# Patient Record
Sex: FEMALE | Race: OTHER | NOT HISPANIC OR LATINO
[De-identification: names, ages, dates, MRNs, and addresses within clinical notes are randomized per-mention and may not be internally consistent; named-entity substitution may affect disease eponyms.]

---

## 2022-01-02 ENCOUNTER — TRANSCRIPTION ENCOUNTER (OUTPATIENT)
Age: 22
End: 2022-01-02

## 2022-06-07 PROBLEM — Z00.00 ENCOUNTER FOR PREVENTIVE HEALTH EXAMINATION: Status: ACTIVE | Noted: 2022-06-07

## 2022-11-09 ENCOUNTER — NON-APPOINTMENT (OUTPATIENT)
Age: 22
End: 2022-11-09

## 2022-11-09 ENCOUNTER — APPOINTMENT (OUTPATIENT)
Dept: PULMONOLOGY | Facility: CLINIC | Age: 22
End: 2022-11-09

## 2022-11-09 VITALS
OXYGEN SATURATION: 98 % | BODY MASS INDEX: 34.39 KG/M2 | SYSTOLIC BLOOD PRESSURE: 122 MMHG | HEIGHT: 66 IN | DIASTOLIC BLOOD PRESSURE: 78 MMHG | HEART RATE: 88 BPM | WEIGHT: 214 LBS | RESPIRATION RATE: 14 BRPM

## 2022-11-09 PROCEDURE — 99204 OFFICE O/P NEW MOD 45 MIN: CPT

## 2022-11-09 NOTE — ASSESSMENT
[FreeTextEntry1] : Patient likely with obstructive sleep apnea.  I discussed the pathophysiology of sleep apnea with her and its association with her snoring and excessive sleepiness.  Polysomnogram has been ordered and I will see her back after that.  We will most likely treat with oral appliance therapy if sleep apnea is present, in view of her age.  I explained to her that weight loss will be important in controlling her sleep apnea.

## 2022-11-09 NOTE — HISTORY OF PRESENT ILLNESS
[TextBox_4] : 21-year-old female who comes for evaluation of obstructive sleep apnea.  She has been noted to have very loud snoring along with waking up choking in the middle of the night and excessive daytime sleepiness.  She is overweight and this has been going on for a few years.  Generally she gets into bed at 10 PM and falls asleep within half hour.  She will wake up around 3 AM for short period of time.  She will get up in the morning at 7 AM feeling unrefreshed, often with headaches.  During the day she drinks a large coffee in the morning.  She not have her tonsils out or braces on her teeth.  She does not recall any family members who have sleep disorders. [ESS] : 11

## 2022-11-09 NOTE — PHYSICAL EXAM
[No Acute Distress] : no acute distress [Normal Oropharynx] : normal oropharynx [Low Lying Soft Palate] : low lying soft palate [Enlarged Base of the Tongue] : enlarged base of the tongue [III] : Mallampati Class: III [Normal Appearance] : normal appearance [No Neck Mass] : no neck mass [Normal Rate/Rhythm] : normal rate/rhythm [Normal S1, S2] : normal s1, s2 [No Murmurs] : no murmurs [No Resp Distress] : no resp distress [Clear to Auscultation Bilaterally] : clear to auscultation bilaterally [No Abnormalities] : no abnormalities [Benign] : benign [Normal Gait] : normal gait [No Clubbing] : no clubbing [No Cyanosis] : no cyanosis [No Edema] : no edema [FROM] : FROM [Normal Color/ Pigmentation] : normal color/ pigmentation [No Focal Deficits] : no focal deficits [Oriented x3] : oriented x3 [Normal Affect] : normal affect [TextBox_2] : Obese

## 2022-11-09 NOTE — CONSULT LETTER
[Dear  ___] : Dear  [unfilled], [Consult Letter:] : I had the pleasure of evaluating your patient, [unfilled]. [Please see my note below.] : Please see my note below. [Consult Closing:] : Thank you very much for allowing me to participate in the care of this patient.  If you have any questions, please do not hesitate to contact me. [Sincerely,] : Sincerely, [FreeTextEntry3] : Johanna Casper MD FCCP\par D-ABSM\par ABIM board certified in  Pulmonary diseases, Sleep medicine\par Internal medicine\par \par [facility] [place]\par

## 2022-12-01 ENCOUNTER — OUTPATIENT (OUTPATIENT)
Dept: OUTPATIENT SERVICES | Facility: HOSPITAL | Age: 22
LOS: 1 days | End: 2022-12-01
Payer: COMMERCIAL

## 2022-12-01 DIAGNOSIS — G47.33 OBSTRUCTIVE SLEEP APNEA (ADULT) (PEDIATRIC): ICD-10-CM

## 2022-12-01 PROCEDURE — 95810 POLYSOM 6/> YRS 4/> PARAM: CPT | Mod: 26

## 2022-12-01 PROCEDURE — 95810 POLYSOM 6/> YRS 4/> PARAM: CPT

## 2022-12-05 ENCOUNTER — TRANSCRIPTION ENCOUNTER (OUTPATIENT)
Age: 22
End: 2022-12-05

## 2022-12-15 ENCOUNTER — APPOINTMENT (OUTPATIENT)
Dept: PULMONOLOGY | Facility: CLINIC | Age: 22
End: 2022-12-15

## 2022-12-15 VITALS
OXYGEN SATURATION: 98 % | HEART RATE: 78 BPM | WEIGHT: 214 LBS | SYSTOLIC BLOOD PRESSURE: 107 MMHG | DIASTOLIC BLOOD PRESSURE: 74 MMHG | HEIGHT: 66 IN | RESPIRATION RATE: 14 BRPM | BODY MASS INDEX: 34.39 KG/M2

## 2022-12-15 PROCEDURE — 99214 OFFICE O/P EST MOD 30 MIN: CPT

## 2022-12-15 NOTE — ASSESSMENT
[FreeTextEntry1] : The patient has moderate obstructive sleep apnea.  I explained its association with her symptoms of sleepiness and headaches.  She is an excellent oral appliance candidate.  A dental referral has been made and I will see her back here after the appliance is fitted for a repeat sleep study with the appliance in place.

## 2022-12-15 NOTE — HISTORY OF PRESENT ILLNESS
[Obstructive Sleep Apnea] : obstructive sleep apnea [Lab] : lab [TextBox_100] : 12/22 [TextBox_108] : 17 [TextBox_112] : 99 [TextBox_116] : 87 [TextBox_165] : I reviewed the patient's sleep study with the patient.\par

## 2022-12-19 ENCOUNTER — TRANSCRIPTION ENCOUNTER (OUTPATIENT)
Age: 22
End: 2022-12-19

## 2023-03-14 ENCOUNTER — NON-APPOINTMENT (OUTPATIENT)
Age: 23
End: 2023-03-14

## 2023-03-21 ENCOUNTER — OFFICE (OUTPATIENT)
Dept: URBAN - METROPOLITAN AREA CLINIC 116 | Facility: CLINIC | Age: 23
Setting detail: OPHTHALMOLOGY
End: 2023-03-21
Payer: COMMERCIAL

## 2023-03-21 DIAGNOSIS — H10.433: ICD-10-CM

## 2023-03-21 PROCEDURE — 92014 COMPRE OPH EXAM EST PT 1/>: CPT | Performed by: OPTOMETRIST

## 2023-03-21 ASSESSMENT — REFRACTION_CURRENTRX
OD_CYLINDER: -1.00
OD_CYLINDER: -1.00
OS_CYLINDER: -1.25
OS_CYLINDER: -1.00
OS_AXIS: 169
OD_OVR_VA: 20/
OS_CYLINDER: -1.75
OD_SPHERE: -3.00
OS_OVR_VA: 20/
OS_AXIS: 157
OS_CYLINDER: -1.00
OD_CYLINDER: -1.00
OD_AXIS: 178
OD_SPHERE: -3.50
OS_AXIS: 170
OS_VPRISM_DIRECTION: SV
OS_VPRISM_DIRECTION: SV
OD_SPHERE: -4.25
OD_CYLINDER: -1.50
OS_SPHERE: -3.50
OS_OVR_VA: 20/
OS_OVR_VA: 20/
OD_OVR_VA: 20/
OS_SPHERE: -3.25
OD_OVR_VA: 20/
OS_SPHERE: -3.50
OD_AXIS: 004
OD_AXIS: 020
OD_VPRISM_DIRECTION: SV
OS_AXIS: 03
OD_SPHERE: -4.00
OS_VPRISM_DIRECTION: SV
OD_AXIS: 04
OD_VPRISM_DIRECTION: SV
OD_VPRISM_DIRECTION: SV
OS_SPHERE: -3.25

## 2023-03-21 ASSESSMENT — REFRACTION_MANIFEST
OS_SPHERE: -3.50
OS_VA1: 20/20
OD_CYLINDER: -1.00
OD_AXIS: 010
OS_AXIS: 165
OD_CYLINDER: -1.00
OD_VA1: 20/20
OS_CYLINDER: -1.00
OS_VA1: 20/20
OS_SPHERE: -3.50
OS_VA1: 20/25
OS_CYLINDER: -1.50
OS_AXIS: 170
OD_VA1: 20/25
OD_VA1: 20/20
OD_AXIS: 005
OD_AXIS: 005
OD_SPHERE: -4.50
OD_SPHERE: -4.00
OS_SPHERE: -3.25
OD_CYLINDER: -1.25
OS_AXIS: 165
OS_SPHERE: -3.00
OD_VA1: 2020-
OS_AXIS: 170
OS_CYLINDER: -1.25
OS_VA1: 20/20
OS_CYLINDER: -1.75
OD_AXIS: 010
OD_CYLINDER: -1.50
OD_SPHERE: -4.50
OD_SPHERE: -4.00

## 2023-03-21 ASSESSMENT — TONOMETRY
OS_IOP_MMHG: 15
OD_IOP_MMHG: 17

## 2023-03-21 ASSESSMENT — AXIALLENGTH_DERIVED
OS_AL: 24.4859
OD_AL: 25.2399
OD_AL: 24.9098
OS_AL: 24.6443
OS_AL: 24.8048
OS_AL: 24.8048
OD_AL: 24.9642
OD_AL: 25.2957
OD_AL: 25.1289
OS_AL: 24.6443

## 2023-03-21 ASSESSMENT — KERATOMETRY
OD_K1POWER_DIOPTERS: 43.75
OD_AXISANGLE_DEGREES: 100
OS_K1POWER_DIOPTERS: 44.25
METHOD_AUTO_MANUAL: AUTO
OS_K2POWER_DIOPTERS: 45.75
OS_AXISANGLE_DEGREES: 080
OD_K2POWER_DIOPTERS: 46.00

## 2023-03-21 ASSESSMENT — REFRACTION_AUTOREFRACTION
OS_CYLINDER: -1.75
OS_SPHERE: -3.50
OD_CYLINDER: -1.75
OD_SPHERE: -4.50
OD_AXIS: 010
OS_AXIS: 170

## 2023-03-21 ASSESSMENT — VISUAL ACUITY
OS_BCVA: 20/30-
OD_BCVA: 20/30

## 2023-03-21 ASSESSMENT — SPHEQUIV_DERIVED
OD_SPHEQUIV: -4.5
OS_SPHEQUIV: -4.375
OD_SPHEQUIV: -5.375
OS_SPHEQUIV: -4
OD_SPHEQUIV: -4.625
OD_SPHEQUIV: -5.25
OS_SPHEQUIV: -4
OS_SPHEQUIV: -4.375
OD_SPHEQUIV: -5
OS_SPHEQUIV: -3.625

## 2023-03-21 ASSESSMENT — CONFRONTATIONAL VISUAL FIELD TEST (CVF)
OS_FINDINGS: FULL
OD_FINDINGS: FULL

## 2023-08-03 ENCOUNTER — NON-APPOINTMENT (OUTPATIENT)
Age: 23
End: 2023-08-03

## 2023-09-28 ENCOUNTER — APPOINTMENT (OUTPATIENT)
Dept: GASTROENTEROLOGY | Facility: CLINIC | Age: 23
End: 2023-09-28

## 2023-10-02 ENCOUNTER — APPOINTMENT (OUTPATIENT)
Dept: PULMONOLOGY | Facility: CLINIC | Age: 23
End: 2023-10-02

## 2023-10-04 ENCOUNTER — APPOINTMENT (OUTPATIENT)
Dept: OBGYN | Facility: CLINIC | Age: 23
End: 2023-10-04

## 2023-10-10 ENCOUNTER — APPOINTMENT (OUTPATIENT)
Dept: OBGYN | Facility: CLINIC | Age: 23
End: 2023-10-10
Payer: MEDICAID

## 2023-10-10 VITALS
SYSTOLIC BLOOD PRESSURE: 130 MMHG | DIASTOLIC BLOOD PRESSURE: 82 MMHG | HEIGHT: 66 IN | BODY MASS INDEX: 34.76 KG/M2 | WEIGHT: 216.3 LBS

## 2023-10-10 DIAGNOSIS — Z86.69 PERSONAL HISTORY OF OTHER DISEASES OF THE NERVOUS SYSTEM AND SENSE ORGANS: ICD-10-CM

## 2023-10-10 DIAGNOSIS — Z78.9 OTHER SPECIFIED HEALTH STATUS: ICD-10-CM

## 2023-10-10 DIAGNOSIS — F17.290 NICOTINE DEPENDENCE, OTHER TOBACCO PRODUCT, UNCOMPLICATED: ICD-10-CM

## 2023-10-10 DIAGNOSIS — Z82.0 FAMILY HISTORY OF EPILEPSY AND OTHER DISEASES OF THE NERVOUS SYSTEM: ICD-10-CM

## 2023-10-10 DIAGNOSIS — Z82.49 FAMILY HISTORY OF ISCHEMIC HEART DISEASE AND OTHER DISEASES OF THE CIRCULATORY SYSTEM: ICD-10-CM

## 2023-10-10 DIAGNOSIS — Z80.42 FAMILY HISTORY OF MALIGNANT NEOPLASM OF PROSTATE: ICD-10-CM

## 2023-10-10 DIAGNOSIS — Z01.419 ENCOUNTER FOR GYNECOLOGICAL EXAMINATION (GENERAL) (ROUTINE) W/OUT ABNORMAL FINDINGS: ICD-10-CM

## 2023-10-10 DIAGNOSIS — Z12.4 ENCOUNTER FOR SCREENING FOR MALIGNANT NEOPLASM OF CERVIX: ICD-10-CM

## 2023-10-10 DIAGNOSIS — Z86.39 PERSONAL HISTORY OF OTHER ENDOCRINE, NUTRITIONAL AND METABOLIC DISEASE: ICD-10-CM

## 2023-10-10 DIAGNOSIS — Z83.3 FAMILY HISTORY OF DIABETES MELLITUS: ICD-10-CM

## 2023-10-10 LAB
BILIRUB UR QL STRIP: NORMAL
CLARITY UR: CLEAR
COLLECTION METHOD: NORMAL
GLUCOSE UR-MCNC: NORMAL
HCG UR QL: 0.2 EU/DL
HGB UR QL STRIP.AUTO: NORMAL
KETONES UR-MCNC: NORMAL
LEUKOCYTE ESTERASE UR QL STRIP: NORMAL
NITRITE UR QL STRIP: NORMAL
PH UR STRIP: 7
PROT UR STRIP-MCNC: NORMAL
SP GR UR STRIP: 1.01

## 2023-10-10 PROCEDURE — 99385 PREV VISIT NEW AGE 18-39: CPT

## 2023-10-10 PROCEDURE — 81003 URINALYSIS AUTO W/O SCOPE: CPT | Mod: QW

## 2023-10-10 RX ORDER — OMEPRAZOLE 40 MG/1
40 CAPSULE, DELAYED RELEASE ORAL
Refills: 0 | Status: ACTIVE | COMMUNITY

## 2023-10-10 RX ORDER — NORETHINDRONE ACETATE AND ETHINYL ESTRADIOL AND FERROUS FUMARATE 1.5-30(21)
1.5-3 KIT ORAL
Refills: 0 | Status: ACTIVE | COMMUNITY

## 2023-10-19 ENCOUNTER — APPOINTMENT (OUTPATIENT)
Dept: OBGYN | Facility: CLINIC | Age: 23
End: 2023-10-19
Payer: MEDICAID

## 2023-10-19 VITALS
BODY MASS INDEX: 34.72 KG/M2 | DIASTOLIC BLOOD PRESSURE: 62 MMHG | SYSTOLIC BLOOD PRESSURE: 100 MMHG | WEIGHT: 216 LBS | HEIGHT: 66 IN

## 2023-10-19 DIAGNOSIS — N76.0 ACUTE VAGINITIS: ICD-10-CM

## 2023-10-19 LAB
C TRACH RRNA SPEC QL NAA+PROBE: NOT DETECTED
ESTIMATED AVERAGE GLUCOSE: 117 MG/DL
HBA1C MFR BLD HPLC: 5.7 %
N GONORRHOEA RRNA SPEC QL NAA+PROBE: NOT DETECTED
PROLACTIN SERPL-MCNC: 44.3 NG/ML
SOURCE AMPLIFICATION: NORMAL
TESTOST FREE SERPL-MCNC: 5 PG/ML
TESTOST SERPL-MCNC: 60.4 NG/DL
TSH SERPL-ACNC: 2.4 UIU/ML

## 2023-10-19 PROCEDURE — 99213 OFFICE O/P EST LOW 20 MIN: CPT

## 2023-10-19 RX ORDER — NORETHINDRONE ACETATE AND ETHINYL ESTRADIOL 1.5; .03 MG/1; MG/1
1.5-3 TABLET ORAL
Qty: 1 | Refills: 6 | Status: ACTIVE | COMMUNITY
Start: 2023-10-19 | End: 1900-01-01

## 2023-10-21 LAB
CANDIDA VAG CYTO: DETECTED
G VAGINALIS+PREV SP MTYP VAG QL MICRO: NOT DETECTED
T VAGINALIS VAG QL WET PREP: NOT DETECTED

## 2023-11-07 RX ORDER — NYSTATIN 100000 1/G
100000 POWDER TOPICAL
Qty: 1 | Refills: 0 | Status: ACTIVE | COMMUNITY
Start: 2023-10-19 | End: 1900-01-01

## 2023-11-07 RX ORDER — FLUCONAZOLE 150 MG/1
150 TABLET ORAL
Qty: 2 | Refills: 0 | Status: ACTIVE | COMMUNITY
Start: 2023-10-19 | End: 1900-01-01

## 2023-11-09 ENCOUNTER — APPOINTMENT (OUTPATIENT)
Dept: PULMONOLOGY | Facility: CLINIC | Age: 23
End: 2023-11-09
Payer: MEDICAID

## 2023-11-09 VITALS
HEART RATE: 79 BPM | RESPIRATION RATE: 16 BRPM | SYSTOLIC BLOOD PRESSURE: 104 MMHG | BODY MASS INDEX: 34.72 KG/M2 | HEIGHT: 66 IN | OXYGEN SATURATION: 98 % | DIASTOLIC BLOOD PRESSURE: 64 MMHG | WEIGHT: 216 LBS

## 2023-11-09 PROCEDURE — 99214 OFFICE O/P EST MOD 30 MIN: CPT

## 2023-11-09 RX ORDER — OMEPRAZOLE 40 MG/1
40 CAPSULE, DELAYED RELEASE ORAL
Refills: 0 | Status: ACTIVE | COMMUNITY

## 2024-01-15 ENCOUNTER — APPOINTMENT (OUTPATIENT)
Dept: OBGYN | Facility: CLINIC | Age: 24
End: 2024-01-15
Payer: COMMERCIAL

## 2024-01-15 VITALS
SYSTOLIC BLOOD PRESSURE: 118 MMHG | BODY MASS INDEX: 35.2 KG/M2 | WEIGHT: 219 LBS | HEIGHT: 66 IN | DIASTOLIC BLOOD PRESSURE: 72 MMHG

## 2024-01-15 DIAGNOSIS — Z30.41 ENCOUNTER FOR SURVEILLANCE OF CONTRACEPTIVE PILLS: ICD-10-CM

## 2024-01-15 DIAGNOSIS — E28.2 POLYCYSTIC OVARIAN SYNDROME: ICD-10-CM

## 2024-01-15 DIAGNOSIS — N91.1 SECONDARY AMENORRHEA: ICD-10-CM

## 2024-01-15 DIAGNOSIS — N92.6 IRREGULAR MENSTRUATION, UNSPECIFIED: ICD-10-CM

## 2024-01-15 LAB
HCG UR QL: NEGATIVE
QUALITY CONTROL: YES

## 2024-01-15 PROCEDURE — 81025 URINE PREGNANCY TEST: CPT

## 2024-01-15 PROCEDURE — 99214 OFFICE O/P EST MOD 30 MIN: CPT

## 2024-01-15 RX ORDER — NORETHINDRONE ACETATE 5 MG/1
5 TABLET ORAL DAILY
Qty: 10 | Refills: 1 | Status: ACTIVE | COMMUNITY
Start: 2024-01-15 | End: 1900-01-01

## 2024-01-15 NOTE — HISTORY OF PRESENT ILLNESS
[FreeTextEntry1] : 22 y.o P0 (LMP09/2023) here for OCP refill and follow up for secondary amenorrhea  Patient overall doing well. States she has not had her period since 09/2023. Sexually active, uses condoms. Continues to take OCP without issue. Takes placebo week, instead of continuous use. Reports pelvic cramping pain around the time when she believes she is ovulating.  Otherwise, no additional complaints

## 2024-01-15 NOTE — DISCUSSION/SUMMARY
[FreeTextEntry1] : #Secondary amenorrhea - Urine pregnancy test: negative - discussed etiologies of abnormal uterine bleeding including hormonal vs structural vs cytological - hormonal causes such as thyroid abnormalities, irregularities in menopausal hormones, and prolactin levels were reviewed, elevated prolactin noted on last visit. Denies any visual disturbances or nipple discharge. Repeat prolactin today, endocrinology consult ordered  - Counseled patient regarding inducing menses with progesterone with transition back to OCP once menses returns.  - She verbalized understanding and agreement with above counseling regarding differential diagnosis, evaluation, and plan. She was given time for questions/concerns which were all answered to her apparent satisfaction.   RTO 1-2 months for follow up

## 2024-01-31 ENCOUNTER — NON-APPOINTMENT (OUTPATIENT)
Age: 24
End: 2024-01-31

## 2024-03-13 NOTE — ASSESSMENT
[FreeTextEntry1] : Moderate obstructive sleep apnea with excessive daytime sleepiness.  This requires treatment.  AutoPap at 4-16 cm H2O  was ordered for the patient and will be delivered to the patient's house.  Appropriate use parameters were discussed with the patient.  Patient will return after being on AutoPap for 6 to 8 weeks so that we can review compliance and efficacy and address any problems the patient may have with AutoPAP. Yes

## 2024-03-13 NOTE — HISTORY OF PRESENT ILLNESS
[TextBox_4] : We diagnosed the patient with moderate obstructive sleep apnea last year.  I felt she was a good oral appliance candidate.  She started to go for oral appliance therapy but her insurance will not cover the oral appliance.  Therefore she needs to be treated with CPAP.  Her weight is similar to where it was last year when we tested her.  She reports ongoing excessive daytime sleepiness.

## 2024-04-22 ENCOUNTER — OFFICE (OUTPATIENT)
Dept: URBAN - METROPOLITAN AREA CLINIC 116 | Facility: CLINIC | Age: 24
Setting detail: OPHTHALMOLOGY
End: 2024-04-22
Payer: COMMERCIAL

## 2024-04-22 DIAGNOSIS — H10.433: ICD-10-CM

## 2024-04-22 DIAGNOSIS — H52.13: ICD-10-CM

## 2024-04-22 PROCEDURE — 92015 DETERMINE REFRACTIVE STATE: CPT | Performed by: OPTOMETRIST

## 2024-04-22 PROCEDURE — 92014 COMPRE OPH EXAM EST PT 1/>: CPT | Performed by: OPTOMETRIST

## 2024-05-09 ENCOUNTER — OFFICE (OUTPATIENT)
Dept: URBAN - METROPOLITAN AREA CLINIC 94 | Facility: CLINIC | Age: 24
Setting detail: OPHTHALMOLOGY
End: 2024-05-09
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
## 2024-05-09 PROCEDURE — NO SHOW FE NO SHOW FEE: Performed by: OPHTHALMOLOGY

## 2024-05-30 ENCOUNTER — APPOINTMENT (OUTPATIENT)
Dept: PULMONOLOGY | Facility: CLINIC | Age: 24
End: 2024-05-30
Payer: COMMERCIAL

## 2024-05-30 VITALS
OXYGEN SATURATION: 98 % | RESPIRATION RATE: 16 BRPM | HEART RATE: 91 BPM | SYSTOLIC BLOOD PRESSURE: 116 MMHG | HEIGHT: 66 IN | BODY MASS INDEX: 33.59 KG/M2 | WEIGHT: 209 LBS | DIASTOLIC BLOOD PRESSURE: 70 MMHG

## 2024-05-30 DIAGNOSIS — R06.83 SNORING: ICD-10-CM

## 2024-05-30 DIAGNOSIS — G47.33 OBSTRUCTIVE SLEEP APNEA (ADULT) (PEDIATRIC): ICD-10-CM

## 2024-05-30 DIAGNOSIS — G47.19 OTHER HYPERSOMNIA: ICD-10-CM

## 2024-05-30 PROCEDURE — 99214 OFFICE O/P EST MOD 30 MIN: CPT

## 2024-05-30 RX ORDER — AMOXICILLIN 875 MG/1
TABLET, FILM COATED ORAL
Refills: 0 | Status: ACTIVE | COMMUNITY

## 2024-05-30 NOTE — END OF VISIT
[Time Spent: ___ minutes] : I have spent [unfilled] minutes of time on the encounter. [FreeTextEntry3] : I reviewed all recent notes, orders, lab results and images for 15 minutes on all available platforms (including [employer], [employer], [employer], and [employer] Epic prior to this visit and made notes. Reviewed the pathophysiology, associated medical conditions, AMARO and Rx options for ISHAAN (including INSPIRE, oral appliance and CPAP. and discussed CPAP accommodation and Compliance monitoring. Reviewed the patient's sleep study with the patient

## 2024-05-30 NOTE — CONSULT LETTER
[Dear  ___] : Dear  [unfilled], [Consult Letter:] : I had the pleasure of evaluating your patient, [unfilled]. [Please see my note below.] : Please see my note below. [Consult Closing:] : Thank you very much for allowing me to participate in the care of this patient.  If you have any questions, please do not hesitate to contact me. [Sincerely,] : Sincerely, [DrCk  ___] : Dr. LE

## 2024-05-30 NOTE — HISTORY OF PRESENT ILLNESS
[TextBox_4] : Dr Casper; 11/9/23 We diagnosed the patient with moderate obstructive sleep apnea last year.  I felt she was a good oral appliance candidate.  She started to go for oral appliance therapy but her insurance will not cover the oral appliance.  Therefore she needs to be treated with CPAP.  Her weight is similar to where it was last year when we tested her.  She reports ongoing excessive daytime sleepiness.  5/30/24 My first visit with this patient  I reviewed all recent notes, orders, lab results and images for 15 minutes on all available platforms (including [facility], [employer], [employer], and [employer] Epic prior to this visit and made notes.  Moderate ISHAAN Oral appliance not covered Unable to comply with APAP  BMI=33.73  CPAP failure Now has insurance that would cover an oral sleep appliance

## 2024-05-30 NOTE — DISCUSSION/SUMMARY
[FreeTextEntry1] : IMP  Obesity  ISHAAN Snoring Excessive daytime sleepiness CPAP failure  Plan  DC CPAP Oral appliance referral Weight loss WP HST 2 month FU

## 2024-08-15 ENCOUNTER — APPOINTMENT (OUTPATIENT)
Dept: PULMONOLOGY | Facility: CLINIC | Age: 24
End: 2024-08-15

## 2024-09-09 ENCOUNTER — APPOINTMENT (OUTPATIENT)
Dept: OBGYN | Facility: CLINIC | Age: 24
End: 2024-09-09

## 2024-12-12 ENCOUNTER — OFFICE (OUTPATIENT)
Dept: URBAN - METROPOLITAN AREA CLINIC 112 | Facility: CLINIC | Age: 24
Setting detail: OPHTHALMOLOGY
End: 2024-12-12
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
## 2024-12-12 PROCEDURE — NO SHOW FE NO SHOW FEE: Performed by: OPHTHALMOLOGY

## 2024-12-19 ENCOUNTER — OFFICE (OUTPATIENT)
Dept: URBAN - METROPOLITAN AREA CLINIC 94 | Facility: CLINIC | Age: 24
Setting detail: OPHTHALMOLOGY
End: 2024-12-19
Payer: MEDICAID

## 2024-12-19 DIAGNOSIS — H52.13: ICD-10-CM

## 2024-12-19 PROCEDURE — SCREF LASIK EVAL: Performed by: OPHTHALMOLOGY

## 2024-12-19 ASSESSMENT — CONFRONTATIONAL VISUAL FIELD TEST (CVF)
OS_FINDINGS: FULL
OD_FINDINGS: FULL

## 2024-12-19 ASSESSMENT — TONOMETRY
OS_IOP_MMHG: 13
OD_IOP_MMHG: 12

## 2024-12-20 ASSESSMENT — REFRACTION_CURRENTRX
OS_AXIS: 169
OS_AXIS: 157
OS_CYLINDER: -1.25
OS_CYLINDER: -1.75
OS_AXIS: 170
OD_SPHERE: -3.50
OD_VPRISM_DIRECTION: SV
OS_SPHERE: -3.50
OS_OVR_VA: 20/
OD_AXIS: 178
OD_CYLINDER: -1.50
OS_OVR_VA: 20/
OD_CYLINDER: -1.00
OD_OVR_VA: 20/
OS_CYLINDER: -1.00
OS_CYLINDER: -1.75
OD_SPHERE: -4.00
OD_CYLINDER: -1.50
OS_SPHERE: -3.50
OD_OVR_VA: 20/
OS_CYLINDER: -1.75
OS_VPRISM_DIRECTION: SV
OS_SPHERE: -3.25
OD_CYLINDER: -1.00
OS_SPHERE: -3.50
OD_AXIS: 010
OS_VPRISM_DIRECTION: SV
OS_VPRISM_DIRECTION: SV
OD_SPHERE: -4.50
OS_SPHERE: -3.50
OS_OVR_VA: 20/
OD_AXIS: 004
OD_AXIS: 020
OD_VPRISM_DIRECTION: SV
OD_CYLINDER: -1.00
OD_SPHERE: -3.00
OD_OVR_VA: 20/
OS_CYLINDER: -1.00
OS_AXIS: 03
OD_SPHERE: -4.25
OS_VPRISM_DIRECTION: SV
OD_VPRISM_DIRECTION: SV
OS_AXIS: 165
OD_AXIS: 04
OD_VPRISM_DIRECTION: SV
OD_SPHERE: -4.50
OS_AXIS: 158
OS_SPHERE: -3.25
OD_AXIS: 007
OD_CYLINDER: -1.50

## 2024-12-20 ASSESSMENT — KERATOMETRY
OS_K2POWER_DIOPTERS: 45.25
METHOD_AUTO_MANUAL: AUTO
OD_AXISANGLE_DEGREES: 103
OD_K2POWER_DIOPTERS: 45.25
OS_AXISANGLE_DEGREES: 081
OD_K1POWER_DIOPTERS: 44.25
OS_K1POWER_DIOPTERS: 44.00

## 2024-12-20 ASSESSMENT — REFRACTION_MANIFEST
OS_CYLINDER: -1.75
OS_VA1: 20/25
OS_AXIS: 170
OS_CYLINDER: -1.00
OS_AXIS: 165
OD_VA1: 20/25
OS_VA1: 20/20
OS_CYLINDER: -1.75
OS_AXIS: 165
OD_SPHERE: -4.50
OS_CYLINDER: -1.25
OS_VA1: 20/25
OS_SPHERE: -3.50
OD_CYLINDER: -1.25
OD_CYLINDER: -1.50
OD_AXIS: 005
OD_AXIS: 005
OD_CYLINDER: -1.00
OD_AXIS: 010
OD_VA1: 20/20
OD_AXIS: 010
OD_SPHERE: -4.00
OD_AXIS: 010
OS_VA1: 20/20
OD_VA1: 20/20
OD_CYLINDER: -1.00
OS_AXIS: 170
OS_AXIS: 165
OS_SPHERE: -3.50
OD_SPHERE: -4.50
OD_VA1: 20/20
OS_SPHERE: -3.25
OS_SPHERE: -3.50
OS_CYLINDER: -1.50
OD_SPHERE: -4.50
OS_VA1: 20/20
OD_VA1: 2020-
OS_SPHERE: -3.00
OS_VA1: 20/20
OD_AXIS: 010
OS_AXIS: 165
OD_SPHERE: -4.00
OD_CYLINDER: -1.50
OD_CYLINDER: -1.50
OD_VA1: 20/25
OD_SPHERE: -4.50
OS_CYLINDER: -1.50
OS_SPHERE: -3.50

## 2024-12-20 ASSESSMENT — REFRACTION_AUTOREFRACTION
OS_SPHERE: -3.50
OD_AXIS: 011
OD_CYLINDER: -2.00
OD_SPHERE: -4.00
OS_AXIS: 165
OS_CYLINDER: -1.50

## 2024-12-20 ASSESSMENT — VISUAL ACUITY
OS_BCVA: 20/20-1
OD_BCVA: 20/20-1

## 2025-03-13 ENCOUNTER — OTHER LOCATION (OUTPATIENT)
Dept: URBAN - METROPOLITAN AREA LASIK CENTER 6 | Facility: LASIK CENTER | Age: 25
Setting detail: OPHTHALMOLOGY
End: 2025-03-13

## 2025-03-13 DIAGNOSIS — H52.13: ICD-10-CM

## 2025-03-13 PROCEDURE — 65760 KERATOMILEUSIS: CPT | Mod: GY,RT,LT | Performed by: OPHTHALMOLOGY

## 2025-03-14 ENCOUNTER — RX ONLY (RX ONLY)
Age: 25
End: 2025-03-14

## 2025-03-14 ENCOUNTER — OFFICE (OUTPATIENT)
Dept: URBAN - METROPOLITAN AREA CLINIC 94 | Facility: CLINIC | Age: 25
Setting detail: OPHTHALMOLOGY
End: 2025-03-14
Payer: MEDICAID

## 2025-03-14 DIAGNOSIS — H17.823: ICD-10-CM

## 2025-03-14 PROCEDURE — 99024 POSTOP FOLLOW-UP VISIT: CPT | Performed by: PHYSICIAN ASSISTANT

## 2025-03-14 ASSESSMENT — REFRACTION_CURRENTRX
OD_CYLINDER: -1.50
OD_SPHERE: -3.50
OD_OVR_VA: 20/
OS_VPRISM_DIRECTION: SV
OD_SPHERE: -4.00
OS_SPHERE: -3.50
OS_CYLINDER: -1.25
OD_VPRISM_DIRECTION: SV
OS_AXIS: 158
OS_SPHERE: -3.25
OD_CYLINDER: -1.00
OD_VPRISM_DIRECTION: SV
OD_AXIS: 020
OS_OVR_VA: 20/
OD_CYLINDER: -1.00
OD_CYLINDER: -1.50
OD_OVR_VA: 20/
OD_SPHERE: -4.25
OS_SPHERE: -3.50
OD_AXIS: 004
OS_CYLINDER: -1.75
OS_SPHERE: -3.25
OS_AXIS: 169
OS_VPRISM_DIRECTION: SV
OD_VPRISM_DIRECTION: SV
OD_SPHERE: -4.50
OS_CYLINDER: -1.00
OD_CYLINDER: -1.50
OD_SPHERE: -3.00
OS_CYLINDER: -1.00
OS_OVR_VA: 20/
OS_AXIS: 170
OS_VPRISM_DIRECTION: SV
OS_SPHERE: -3.50
OD_AXIS: 178
OS_AXIS: 165
OD_AXIS: 04
OD_AXIS: 007
OD_SPHERE: -4.50
OD_CYLINDER: -1.00
OS_AXIS: 03
OD_AXIS: 010
OD_OVR_VA: 20/
OS_CYLINDER: -1.75
OS_VPRISM_DIRECTION: SV
OS_OVR_VA: 20/
OS_AXIS: 157
OD_VPRISM_DIRECTION: SV
OS_SPHERE: -3.50
OS_CYLINDER: -1.75

## 2025-03-14 ASSESSMENT — VISUAL ACUITY
OS_BCVA: 20/20-1
OD_BCVA: 20/20

## 2025-03-14 ASSESSMENT — REFRACTION_MANIFEST
OS_AXIS: 165
OD_SPHERE: -4.00
OD_AXIS: 010
OD_VA1: 20/20
OD_CYLINDER: -1.00
OS_AXIS: 170
OD_CYLINDER: -1.00
OS_CYLINDER: -1.75
OS_VA1: 20/25
OD_SPHERE: -4.00
OD_AXIS: 010
OS_VA1: 20/20
OD_AXIS: 010
OD_SPHERE: -4.50
OS_VA1: 20/20
OD_SPHERE: -4.50
OD_VA1: 20/25
OS_SPHERE: -3.50
OD_SPHERE: -4.50
OD_AXIS: 010
OS_CYLINDER: -1.50
OS_VA1: 20/20
OS_SPHERE: -3.50
OD_CYLINDER: -1.50
OD_CYLINDER: -1.25
OS_CYLINDER: -1.25
OS_CYLINDER: -1.50
OS_VA1: 20/25
OD_CYLINDER: -1.50
OD_SPHERE: -4.50
OS_SPHERE: -3.50
OD_CYLINDER: -1.50
OS_AXIS: 165
OD_VA1: 20/25
OS_AXIS: 170
OS_AXIS: 165
OS_CYLINDER: -1.00
OD_AXIS: 005
OD_VA1: 20/20
OS_SPHERE: -3.50
OD_VA1: 2020-
OD_VA1: 20/20
OS_CYLINDER: -1.75
OS_VA1: 20/20
OD_AXIS: 005
OS_AXIS: 165
OS_SPHERE: -3.00
OS_SPHERE: -3.25

## 2025-03-14 ASSESSMENT — REFRACTION_AUTOREFRACTION
OD_CYLINDER: -0.25
OD_AXIS: 045
OS_SPHERE: +1.00
OS_CYLINDER: -0.25
OS_AXIS: 120
OD_SPHERE: +0.75

## 2025-03-14 ASSESSMENT — KERATOMETRY
OS_K2POWER_DIOPTERS: 40.50
OD_AXISANGLE_DEGREES: 164
OS_AXISANGLE_DEGREES: 065
OS_K1POWER_DIOPTERS: 40.25
METHOD_AUTO_MANUAL: AUTO
OD_K1POWER_DIOPTERS: 40.00
OD_K2POWER_DIOPTERS: 40.25

## 2025-03-14 ASSESSMENT — CONFRONTATIONAL VISUAL FIELD TEST (CVF)
OS_FINDINGS: FULL
OD_FINDINGS: FULL

## 2025-03-21 ENCOUNTER — OFFICE (OUTPATIENT)
Dept: URBAN - METROPOLITAN AREA CLINIC 94 | Facility: CLINIC | Age: 25
Setting detail: OPHTHALMOLOGY
End: 2025-03-21
Payer: MEDICAID

## 2025-03-21 DIAGNOSIS — H17.823: ICD-10-CM

## 2025-03-21 PROCEDURE — 99024 POSTOP FOLLOW-UP VISIT: CPT | Performed by: PHYSICIAN ASSISTANT

## 2025-03-21 ASSESSMENT — REFRACTION_CURRENTRX
OS_CYLINDER: -1.25
OS_CYLINDER: -1.75
OS_AXIS: 157
OD_CYLINDER: -1.00
OS_AXIS: 165
OS_SPHERE: -3.50
OS_SPHERE: -3.25
OS_VPRISM_DIRECTION: SV
OD_CYLINDER: -1.00
OD_VPRISM_DIRECTION: SV
OD_AXIS: 178
OS_AXIS: 158
OS_AXIS: 169
OD_SPHERE: -3.00
OS_VPRISM_DIRECTION: SV
OS_AXIS: 03
OD_SPHERE: -4.00
OD_CYLINDER: -1.50
OS_OVR_VA: 20/
OD_SPHERE: -4.50
OD_AXIS: 004
OS_SPHERE: -3.50
OD_VPRISM_DIRECTION: SV
OD_AXIS: 020
OD_AXIS: 04
OD_AXIS: 007
OD_CYLINDER: -1.50
OD_OVR_VA: 20/
OS_AXIS: 170
OS_OVR_VA: 20/
OS_SPHERE: -3.50
OS_SPHERE: -3.50
OS_OVR_VA: 20/
OD_OVR_VA: 20/
OD_SPHERE: -4.25
OD_VPRISM_DIRECTION: SV
OD_OVR_VA: 20/
OD_AXIS: 010
OD_SPHERE: -3.50
OD_SPHERE: -4.50
OS_VPRISM_DIRECTION: SV
OD_VPRISM_DIRECTION: SV
OS_CYLINDER: -1.75
OD_CYLINDER: -1.00
OD_CYLINDER: -1.50
OS_CYLINDER: -1.00
OS_VPRISM_DIRECTION: SV
OS_CYLINDER: -1.75
OS_CYLINDER: -1.00
OS_SPHERE: -3.25

## 2025-03-21 ASSESSMENT — REFRACTION_MANIFEST
OD_AXIS: 010
OS_CYLINDER: -1.50
OD_CYLINDER: -1.50
OS_CYLINDER: -1.00
OD_AXIS: 010
OD_SPHERE: -4.50
OS_SPHERE: -3.50
OS_SPHERE: -3.25
OD_VA1: 20/25
OD_AXIS: 005
OD_CYLINDER: -1.25
OS_VA1: 20/20
OS_AXIS: 170
OS_VA1: 20/25
OS_SPHERE: -3.00
OS_SPHERE: -3.50
OD_SPHERE: -4.00
OS_CYLINDER: -1.75
OD_SPHERE: -4.50
OS_VA1: 20/20
OD_AXIS: 010
OS_CYLINDER: -1.50
OS_SPHERE: -3.50
OS_CYLINDER: -1.75
OD_SPHERE: -4.50
OD_VA1: 20/20
OS_AXIS: 165
OD_VA1: 2020-
OD_CYLINDER: -1.00
OS_AXIS: 165
OS_VA1: 20/20
OD_CYLINDER: -1.50
OD_SPHERE: -4.50
OS_VA1: 20/20
OD_VA1: 20/25
OD_VA1: 20/20
OS_AXIS: 165
OD_AXIS: 005
OD_CYLINDER: -1.00
OD_SPHERE: -4.00
OS_CYLINDER: -1.25
OD_VA1: 20/20
OS_AXIS: 165
OD_CYLINDER: -1.50
OD_AXIS: 010
OS_SPHERE: -3.50
OS_AXIS: 170
OS_VA1: 20/25

## 2025-03-21 ASSESSMENT — REFRACTION_AUTOREFRACTION
OD_SPHERE: +0.50
OS_CYLINDER: -0.25
OS_AXIS: 169
OD_CYLINDER: -0.25
OS_SPHERE: +0.75
OD_AXIS: 081

## 2025-03-21 ASSESSMENT — KERATOMETRY
OS_K2POWER_DIOPTERS: 40.50
OS_AXISANGLE_DEGREES: 083
METHOD_AUTO_MANUAL: AUTO
OD_K2POWER_DIOPTERS: 40.00
OD_K1POWER_DIOPTERS: 40.00
OD_AXISANGLE_DEGREES: 090
OS_K1POWER_DIOPTERS: 40.25

## 2025-03-21 ASSESSMENT — CONFRONTATIONAL VISUAL FIELD TEST (CVF)
OS_FINDINGS: FULL
OD_FINDINGS: FULL

## 2025-03-21 ASSESSMENT — VISUAL ACUITY
OS_BCVA: 20/20
OD_BCVA: 20/20

## 2025-04-17 ENCOUNTER — OFFICE (OUTPATIENT)
Dept: URBAN - METROPOLITAN AREA CLINIC 94 | Facility: CLINIC | Age: 25
Setting detail: OPHTHALMOLOGY
End: 2025-04-17
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
## 2025-04-17 PROCEDURE — NO SHOW FE NO SHOW FEE: Performed by: OPHTHALMOLOGY

## 2025-06-12 ENCOUNTER — NON-APPOINTMENT (OUTPATIENT)
Age: 25
End: 2025-06-12